# Patient Record
Sex: MALE | Race: WHITE | NOT HISPANIC OR LATINO | Employment: FULL TIME | URBAN - METROPOLITAN AREA
[De-identification: names, ages, dates, MRNs, and addresses within clinical notes are randomized per-mention and may not be internally consistent; named-entity substitution may affect disease eponyms.]

---

## 2019-06-21 ENCOUNTER — HISTORICAL (OUTPATIENT)
Dept: RADIOLOGY | Facility: HOSPITAL | Age: 64
End: 2019-06-21

## 2023-02-05 ENCOUNTER — HOSPITAL ENCOUNTER (EMERGENCY)
Facility: HOSPITAL | Age: 68
Discharge: HOME | End: 2023-02-05
Attending: STUDENT IN AN ORGANIZED HEALTH CARE EDUCATION/TRAINING PROGRAM
Payer: COMMERCIAL

## 2023-02-05 VITALS
TEMPERATURE: 98 F | SYSTOLIC BLOOD PRESSURE: 142 MMHG | RESPIRATION RATE: 17 BRPM | OXYGEN SATURATION: 96 % | HEART RATE: 60 BPM | DIASTOLIC BLOOD PRESSURE: 72 MMHG
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
## 2023-02-05 DIAGNOSIS — Q73.8 REDUCTION DEFECT OF EXTREMITY: ICD-10-CM

## 2023-02-05 DIAGNOSIS — R52 PAIN: ICD-10-CM

## 2023-02-05 DIAGNOSIS — S52.502A CLOSED FRACTURE OF DISTAL END OF LEFT RADIUS, UNSPECIFIED FRACTURE MORPHOLOGY, INITIAL ENCOUNTER: Primary | ICD-10-CM

## 2023-02-05 DIAGNOSIS — M25.532 LEFT WRIST PAIN: ICD-10-CM

## 2023-02-05 PROCEDURE — 99285 EMERGENCY DEPT VISIT HI MDM: CPT | Mod: 25

## 2023-02-05 PROCEDURE — 96374 THER/PROPH/DIAG INJ IV PUSH: CPT

## 2023-02-05 PROCEDURE — 96375 TX/PRO/DX INJ NEW DRUG ADDON: CPT

## 2023-02-05 PROCEDURE — 63600175 PHARM REV CODE 636 W HCPCS: Performed by: STUDENT IN AN ORGANIZED HEALTH CARE EDUCATION/TRAINING PROGRAM

## 2023-02-05 PROCEDURE — 96361 HYDRATE IV INFUSION ADD-ON: CPT

## 2023-02-05 RX ORDER — ONDANSETRON 2 MG/ML
4 INJECTION INTRAMUSCULAR; INTRAVENOUS
Status: COMPLETED | OUTPATIENT
Start: 2023-02-05 | End: 2023-02-05

## 2023-02-05 RX ORDER — IBUPROFEN 600 MG/1
600 TABLET ORAL EVERY 6 HOURS PRN
Qty: 15 TABLET | Refills: 0 | Status: SHIPPED | OUTPATIENT
Start: 2023-02-05 | End: 2023-02-10

## 2023-02-05 RX ORDER — PROPOFOL 10 MG/ML
100 VIAL (ML) INTRAVENOUS ONCE
Status: COMPLETED | OUTPATIENT
Start: 2023-02-05 | End: 2023-02-05

## 2023-02-05 RX ORDER — HYDROCODONE BITARTRATE AND ACETAMINOPHEN 5; 325 MG/1; MG/1
1 TABLET ORAL EVERY 8 HOURS PRN
Qty: 18 TABLET | Refills: 0 | Status: ON HOLD | OUTPATIENT
Start: 2023-02-05 | End: 2023-02-09 | Stop reason: HOSPADM

## 2023-02-05 RX ORDER — HYDROMORPHONE HYDROCHLORIDE 2 MG/ML
1 INJECTION, SOLUTION INTRAMUSCULAR; INTRAVENOUS; SUBCUTANEOUS
Status: COMPLETED | OUTPATIENT
Start: 2023-02-05 | End: 2023-02-05

## 2023-02-05 RX ADMIN — ONDANSETRON 4 MG: 2 INJECTION INTRAMUSCULAR; INTRAVENOUS at 10:02

## 2023-02-05 RX ADMIN — HYDROMORPHONE HYDROCHLORIDE 1 MG: 2 INJECTION, SOLUTION INTRAMUSCULAR; INTRAVENOUS; SUBCUTANEOUS at 10:02

## 2023-02-05 RX ADMIN — SODIUM CHLORIDE, POTASSIUM CHLORIDE, SODIUM LACTATE AND CALCIUM CHLORIDE 1000 ML: 600; 310; 30; 20 INJECTION, SOLUTION INTRAVENOUS at 10:02

## 2023-02-05 RX ADMIN — PROPOFOL 180 MG: 10 INJECTION, EMULSION INTRAVENOUS at 10:02

## 2023-02-05 NOTE — ED PROVIDER NOTES
Encounter Date: 2/5/2023    SCRIBE #1 NOTE: I, Jamarcus Flood, am scribing for, and in the presence of,  Nikolas Hoskins MD. I have scribed the following portions of the note - Other sections scribed: HPI, ROS, PE, MDM, Procedure.     History     Chief Complaint   Patient presents with    Arm Injury     Pt to ER via POV for left wrist pain.  Pt states he tripped over bed this am and landed on hand, swelling noted to left wrist     Patient is a 66 y/o male with a history of HTN presents to [facility] ED after sustaining a left wrist deformity secondary to bracing himself upon tripping and falling over the corner of a bed in a motel room this morning. Patient has complaints of left wrist pain/swelling. Patient took x2 Tylenol this morning with minimal relief.   Patient denies any shoulder/elbow pain.     The history is provided by the patient and the spouse. No  was used.   Arm Injury   The incident occurred today. The incident occurred at another residence. The injury mechanism was a fall. He came to the ER via by private vehicle. He has received no recent medical care. Pertinent negatives include no chest pain, no visual disturbance, no abdominal pain and no weakness.   Review of patient's allergies indicates:  No Known Allergies  Past Medical History:   Diagnosis Date    Anxiety     Arthritis     Closed fracture of distal end of left radius     HLD (hyperlipidemia)     Hypertension     Obesity     Pneumothorax, traumatic 09/2022    d/t broken ribs    Sleep apnea     uses CPAP     Past Surgical History:   Procedure Laterality Date    BACK SURGERY      COLONOSCOPY      KNEE ARTHROSCOPY Bilateral     OPEN REDUCTION AND INTERNAL FIXATION (ORIF) OF INJURY OF FOREARM Left 2/9/2023    Procedure: ORIF, FOREARM;  Surgeon: Cleve Stout DO;  Location: [facility];  Service: Orthopedics;  Laterality: Left;     History reviewed. No pertinent family history.  Social History     Tobacco Use    Smoking status:  Never    Smokeless tobacco: Never   Substance Use Topics    Alcohol use: Yes     Alcohol/week: 21.0 standard drinks     Types: 21 Cans of beer per week    Drug use: Never     Review of Systems   Constitutional:  Negative for fever.   HENT:  Negative for sore throat.    Eyes:  Negative for visual disturbance.   Respiratory:  Negative for shortness of breath.    Cardiovascular:  Negative for chest pain.   Gastrointestinal:  Negative for abdominal pain.   Genitourinary:  Negative for dysuria.   Musculoskeletal:  Negative for joint swelling.        L wrist deformity   Skin:  Negative for rash.   Neurological:  Negative for weakness.   Psychiatric/Behavioral:  Negative for confusion.    All other systems reviewed and are negative.    Physical Exam     Initial Vitals [02/05/23 0809]   BP Pulse Resp Temp SpO2   (!) 148/79 64 18 98.3 °F (36.8 °C) 98 %      MAP       --         Physical Exam    Nursing note and vitals reviewed.  Constitutional: He appears well-developed. He does not appear ill. No distress.   HENT:   Head: Normocephalic and atraumatic.   Mouth/Throat: Oropharynx is clear and moist.   No abrasions, contusions, lacerations to the scalp or face.  No superior inferior orbital ridge tenderness to palpation.  No zygomatic arch tenderness to palpation.  No epistaxis.  No CSF rhinorrhea.  No septal hematoma.  No intraoral injuries noted.  Normal external ear.  No raccoon eyes.  No King sign.     Eyes: Conjunctivae and EOM are normal.   Neck: Neck supple.   Normal range of motion.  Cardiovascular:  Normal rate and regular rhythm.           No murmur heard.  Pulmonary/Chest: Breath sounds normal. No respiratory distress. He exhibits no tenderness.   Abdominal: Abdomen is soft. Bowel sounds are normal. He exhibits no distension. There is no abdominal tenderness.   Musculoskeletal:         General: Tenderness and edema present.      Cervical back: Normal range of motion and neck supple.      Lumbar back: Normal. No  "tenderness. Normal range of motion.      Comments: No C,T or L-spine vertebral point tenderness to palpation, no step-offs, no deformities.  Right upper extremity:  Full range of motion of shoulder, elbow, wrist, no deformity or tenderness to palpation.  Left upper extremity: Full range of motion of shoulder, elbow, no deformity or tenderness to palpation. Deformity of L wrist.   Right lower extremity:  Full range of motion of hip, knee, ankle, no tenderness palpation or deformity noted.  Left lower extremity:  Full range of motion of hip, knee, ankle, no tenderness palpation or deformity noted.       Neurological: He is alert and oriented to person, place, and time. He has normal strength. No cranial nerve deficit or sensory deficit.   Psychiatric: He has a normal mood and affect. His mood appears not anxious.       ED Course   Procedural Sedation        Date/Time: 2023 10:16 AM  Performed by: Nikolas Hoskins MD  Authorized by: Nikolas Hoskins MD   Consent Done: Yes  Consent: Written consent obtained.  Risks and benefits: risks, benefits and alternatives were discussed  Consent given by: patient  Patient understanding: patient states understanding of the procedure being performed  Patient consent: the patient's understanding of the procedure matches consent given  Procedure consent: procedure consent matches procedure scheduled  Relevant documents: relevant documents present and verified  Test results: test results available and properly labeled  Site marked: the operative site was marked  Imaging studies: imaging studies available  Patient identity confirmed:  and MRN  Time out: Immediately prior to procedure a "time out" was called to verify the correct patient, procedure, equipment, support staff and site/side marked as required.  ASA Class: Class 1 - Heathy patient. No medical history.NPO STATUS:  Date/Time of last solid: 2023 7:00 PM  Date/Time of last fluid: 2023 7:00 AM    Equipment: on " supplemental oxygen., on cardiac monitor., on BP monitor., on CO2 monitor., suction available., airway equipment available. and reversal drugs available.     Sedation type: moderate (conscious) sedation    Sedatives: propofol  Sedation start date/time: 2/5/2023 10:16 AM  Sedation end date/time: 2/5/2023 10:32 AM  Vitals: Vital signs were monitored during sedation.  Complications: No complications.   Patient/Family history of anesthesia or sedation complications: No    Splint Application    Date/Time: 2/5/2023 10:38 AM  Performed by: Nikolas Hoskins MD  Authorized by: Nikolas Hoskins MD   Location details: left wrist  Splint type: sugar tong  Supplies used: Ortho-Glass and cotton padding  Post-procedure: The splinted body part was neurovascularly unchanged following the procedure.  Patient tolerance: Patient tolerated the procedure well with no immediate complications      Orthopedic Injury    Date/Time: 2/16/2023 7:29 AM  Performed by: Nikolas Hoskins MD  Authorized by: Nikolas Hoskins MD     Location procedure was performed:  [facility] EMERGENCY DEPARTMENT  Injury:     Injury location:  Wrist    Location details:  Left wrist    Injury type:  Fracture-dislocation      Pre-procedure assessment:     Neurovascular status: Neurovascularly intact      Distal perfusion: normal      Neurological function: normal      Range of motion: reduced      Local anesthesia used?: No      Patient sedated?: Yes      Sedation type: moderate (conscious) sedation      Sedation:  Propofol      Selections made in this section will also lock the Injury type section above.:     Manipulation performed?: Yes      Skin traction used?: Yes      Confirmation: Reduction confirmed by x-ray      Immobilization:  Splint    Splint type:  Sugar tong    Supplies used:  Ortho-Glass  Post-procedure assessment:     Neurovascular status: Neurovascularly intact      Distal perfusion: normal      Neurological function: normal      Range of motion: splinted       Patient tolerance:  Patient tolerated the procedure well with no immediate complications  Labs Reviewed - No data to display       Imaging Results              X-Ray Wrist 2 View Left (Final result)  Result time 02/05/23 11:06:26   Procedure changed from X-Ray Wrist Complete Left     Final result by Jj Harry MD (02/05/23 11:06:26)                   Impression:      As above.      Electronically signed by: Jj Harry  Date:    02/05/2023  Time:    11:06               Narrative:    EXAMINATION:  XR WRIST 2 VIEW LEFT    CLINICAL HISTORY:  post reduction;  Other reduction defects of unspecified limb(s)    COMPARISON:  Earlier today    FINDINGS:  Two views of the left wrist.  Interval splinting with improved position and alignment of the distal radial fracture.                                       X-Ray Elbow Complete Left (Final result)  Result time 02/05/23 09:59:55      Final result by Jj Harry MD (02/05/23 09:59:55)                   Impression:      No acute findings.      Electronically signed by: Jj Harry  Date:    02/05/2023  Time:    09:59               Narrative:    EXAMINATION:  XR ELBOW COMPLETE 3 VIEW LEFT    CLINICAL HISTORY:  Unspecified fall, initial encounter    COMPARISON:  None    FINDINGS:  Three views of the left elbow.  There is no fracture, dislocation or convincing joint effusion.                                       X-Ray Wrist Complete Left (Final result)  Result time 02/05/23 09:59:27      Final result by Jj Harry MD (02/05/23 09:59:27)                   Impression:      Left wrist fracture as discussed.      Electronically signed by: Jj Harry  Date:    02/05/2023  Time:    09:59               Narrative:    EXAMINATION:  XR WRIST COMPLETE 3 VIEWS LEFT    CLINICAL HISTORY:  Pain, unspecified    COMPARISON:  None    FINDINGS:  Three views of the left wrist.  There is comminuted and impacted fracture through the distal radial metaphysis.  There is dorsal angulation  of the distal fracture fragment.  Intra-articular extension of the fracture is possible.  There is also ulnar styloid fracture.  Degenerative changes are seen primarily at the 1st carpometacarpal joint.                                    X-Rays:   Independently Interpreted Readings:   Other Readings:  Xr left wrist: Radius fx.     Xr after reduction: Improved alignment.   Medications   propofol (DIPRIVAN) 10 mg/mL IVP (180 mg Intravenous Given 2/5/23 1045)   HYDROmorphone (PF) injection 1 mg (1 mg Intravenous Given 2/5/23 1003)   ondansetron injection 4 mg (4 mg Intravenous Given 2/5/23 1003)   lactated ringers bolus 1,000 mL (0 mLs Intravenous Stopped 2/5/23 1151)     Medical Decision Making:   History:   I obtained history from: someone other than patient.       <> Summary of History: Collateral history obtained from wife at bedside who states that the patient had a trip and fall this morning. Patient took x2 Tylenol prior to arrival with minimal relief.   Old Medical Records: I decided to obtain old medical records.  Old Records Summarized: records from clinic visits and records from previous admission(s).       <> Summary of Records: Reviewed previous records.  Patient has history of high cholesterol, hypertension.  Initial Assessment:   Left wrist fracture  Differential Diagnosis:   Left wrist fracture, contusion, dislocation,  Independently Interpreted Test(s):   I have ordered and independently interpreted X-rays - see prior notes.  Clinical Tests:   Radiological Study: Ordered and Reviewed  ED Management:  Patient is a 67-year-old male presents to the emergency department for left wrist fracture.  See HPI.  See physical exam.  Reduced under conscious sedation and splinted.  Discussed with Orthopedic surgery.  Discussed need for follow-up with orthopedic surgery outpatient with the patient and family.  Answered all questions this time.  Pain control.  Discussed all results answered all questions at this time.   Hemodynamically stable for continued outpatient management strict return precautions.  Neurovascularly intact after splint placement.  Other:   I have discussed this case with another health care provider.   Medical Decision Making  Problems Addressed:  Closed fracture of distal end of left radius, unspecified fracture morphology, initial encounter: acute illness or injury that poses a threat to life or bodily functions  Left wrist pain: acute illness or injury that poses a threat to life or bodily functions  Reduction defect of extremity: acute illness or injury that poses a threat to life or bodily functions    Amount and/or Complexity of Data Reviewed  External Data Reviewed: radiology.  Radiology: ordered and independent interpretation performed.    Risk  Parenteral controlled substances.           Scribe Attestation:   Scribe #1: I performed the above scribed service and the documentation accurately describes the services I performed. I attest to the accuracy of the note.    Attending Attestation:           Physician Attestation for Scribe:  Physician Attestation Statement for Scribe #1: I, Nikolas Hoskins MD, reviewed documentation, as scribed by Jamarcus Flood in my presence, and it is both accurate and complete.                        Clinical Impression:   Final diagnoses:  [R52] Pain  [W19.XXXA] Fall  [Q73.8] Reduction defect of extremity  [W19.XXXA] Fall, initial encounter  [M25.532] Left wrist pain  [S52.502A] Closed fracture of distal end of left radius, unspecified fracture morphology, initial encounter (Primary)        ED Disposition Condition    Discharge Stable          ED Prescriptions       Medication Sig Dispense Start Date End Date Auth. Provider    HYDROcodone-acetaminophen (NORCO) 5-325 mg per tablet () Take 1 tablet by mouth every 8 (eight) hours as needed for Pain. 18 tablet 2023 Nikolas Hoskins MD    ibuprofen (ADVIL,MOTRIN) 600 MG tablet () Take 1 tablet  (600 mg total) by mouth every 6 (six) hours as needed for Pain. 15 tablet 2/5/2023 2/10/2023 Nikolas Hoskins MD          Follow-up Information       Follow up With Specialties Details Why Contact Info    Rodolfo Castillo MD Family Medicine   [address] [facility] [address]  [phone]      Your primary care physician.        Cleve Stout, DO Orthopedic Surgery   [address] [facility] [facility]  [address]  [facility] [address]  [phone]               Nikolas Hoskins MD  02/16/23 0732

## 2023-02-05 NOTE — DISCHARGE INSTRUCTIONS
Follow-up with orthopedic surgery.  Please see phone number for follow-up appointment.      You may take medications as prescribed for pain.      Follow up with your primary care physician or provider in 1-2 days.      Return to the emergency department if any you have any worsening symptoms, new symptoms, or any other concerns.      Please signup for MyChart as noted below in your paperwork to review all labwork, imaging results, and any other incidental findings from today's visit.       Advice after your visit:  Please follow-up with your primary care doctor and/or specialist within 1-2 days.  Please return if you have any worsening in your condition or if you have any other concerns.    If you had radiology exams like an XRAY. Bring these to your primary care doctor and/or specialist for further review of incidental findings.    If you were prescribed OPIATE PAIN MEDICATION - please understand of these medications can be addictive, you may fill less of the prescription was written for, you do not have to take the full prescription.  You may discard what you do not use.  Please seek help if you feel you are having problems with addiction.  Do not drive or operate heavy machinery if you are taking sedating medications.  Do not mix these medications with alcohol.      If you had a SPLINT placed in the emergency department if you have severe pain numbness tingling or discoloration of year digits please remove the splint and return to the emergency department for further evaluation as this may represent a sign of compromise to the nerves or blood vessels due to swelling.

## 2023-02-06 ENCOUNTER — OFFICE VISIT (OUTPATIENT)
Dept: ORTHOPEDICS | Facility: CLINIC | Age: 68
End: 2023-02-06
Payer: COMMERCIAL

## 2023-02-06 ENCOUNTER — LAB VISIT (OUTPATIENT)
Dept: LAB | Facility: HOSPITAL | Age: 68
End: 2023-02-06
Attending: ORTHOPAEDIC SURGERY
Payer: COMMERCIAL

## 2023-02-06 VITALS
WEIGHT: 222 LBS | HEIGHT: 71 IN | SYSTOLIC BLOOD PRESSURE: 130 MMHG | HEART RATE: 61 BPM | TEMPERATURE: 98 F | BODY MASS INDEX: 31.08 KG/M2 | DIASTOLIC BLOOD PRESSURE: 77 MMHG | RESPIRATION RATE: 18 BRPM

## 2023-02-06 DIAGNOSIS — S52.502A CLOSED FRACTURE OF DISTAL END OF LEFT RADIUS, UNSPECIFIED FRACTURE MORPHOLOGY, INITIAL ENCOUNTER: Primary | ICD-10-CM

## 2023-02-06 DIAGNOSIS — F10.20 ALCOHOLISM: ICD-10-CM

## 2023-02-06 DIAGNOSIS — S52.502A CLOSED FRACTURE OF DISTAL END OF LEFT RADIUS, UNSPECIFIED FRACTURE MORPHOLOGY, INITIAL ENCOUNTER: ICD-10-CM

## 2023-02-06 LAB
ANION GAP SERPL CALC-SCNC: 9 MEQ/L
BASOPHILS # BLD AUTO: 0.05 X10(3)/MCL (ref 0–0.2)
BASOPHILS NFR BLD AUTO: 0.7 %
BUN SERPL-MCNC: 18.5 MG/DL (ref 8.4–25.7)
CALCIUM SERPL-MCNC: 9.6 MG/DL (ref 8.8–10)
CHLORIDE SERPL-SCNC: 104 MMOL/L (ref 98–107)
CO2 SERPL-SCNC: 27 MMOL/L (ref 23–31)
CREAT SERPL-MCNC: 1.01 MG/DL (ref 0.73–1.18)
CREAT/UREA NIT SERPL: 18
EOSINOPHIL # BLD AUTO: 0.18 X10(3)/MCL (ref 0–0.9)
EOSINOPHIL NFR BLD AUTO: 2.4 %
ERYTHROCYTE [DISTWIDTH] IN BLOOD BY AUTOMATED COUNT: 12.5 % (ref 11.5–17)
GFR SERPLBLD CREATININE-BSD FMLA CKD-EPI: >60 MLS/MIN/1.73/M2
GLUCOSE SERPL-MCNC: 102 MG/DL (ref 82–115)
HCT VFR BLD AUTO: 42.5 % (ref 42–52)
HGB BLD-MCNC: 14 GM/DL (ref 14–18)
IMM GRANULOCYTES # BLD AUTO: 0.03 X10(3)/MCL (ref 0–0.04)
IMM GRANULOCYTES NFR BLD AUTO: 0.4 %
INR BLD: 0.95 (ref 2–3)
LYMPHOCYTES # BLD AUTO: 1.43 X10(3)/MCL (ref 0.6–4.6)
LYMPHOCYTES NFR BLD AUTO: 18.8 %
MCH RBC QN AUTO: 32.5 PG
MCHC RBC AUTO-ENTMCNC: 32.9 MG/DL (ref 33–36)
MCV RBC AUTO: 98.6 FL (ref 80–94)
MONOCYTES # BLD AUTO: 1.01 X10(3)/MCL (ref 0.1–1.3)
MONOCYTES NFR BLD AUTO: 13.3 %
NEUTROPHILS # BLD AUTO: 4.91 X10(3)/MCL (ref 2.1–9.2)
NEUTROPHILS NFR BLD AUTO: 64.4 %
NRBC BLD AUTO-RTO: 0 %
PLATELET # BLD AUTO: 269 X10(3)/MCL (ref 130–400)
PMV BLD AUTO: 10.4 FL (ref 7.4–10.4)
POTASSIUM SERPL-SCNC: 4.2 MMOL/L (ref 3.5–5.1)
PROTHROMBIN TIME: 12.5 SECONDS (ref 11.7–14.5)
RBC # BLD AUTO: 4.31 X10(6)/MCL (ref 4.7–6.1)
SODIUM SERPL-SCNC: 140 MMOL/L (ref 136–145)
WBC # SPEC AUTO: 7.6 X10(3)/MCL (ref 4.5–11.5)

## 2023-02-06 PROCEDURE — 4010F PR ACE/ARB THEARPY RXD/TAKEN: ICD-10-PCS | Mod: CPTII,,, | Performed by: ORTHOPAEDIC SURGERY

## 2023-02-06 PROCEDURE — 99204 PR OFFICE/OUTPT VISIT, NEW, LEVL IV, 45-59 MIN: ICD-10-PCS | Mod: ,,, | Performed by: ORTHOPAEDIC SURGERY

## 2023-02-06 PROCEDURE — 3008F PR BODY MASS INDEX (BMI) DOCUMENTED: ICD-10-PCS | Mod: CPTII,,, | Performed by: ORTHOPAEDIC SURGERY

## 2023-02-06 PROCEDURE — 3078F DIAST BP <80 MM HG: CPT | Mod: CPTII,,, | Performed by: ORTHOPAEDIC SURGERY

## 2023-02-06 PROCEDURE — 85025 COMPLETE CBC W/AUTO DIFF WBC: CPT

## 2023-02-06 PROCEDURE — 3075F PR MOST RECENT SYSTOLIC BLOOD PRESS GE 130-139MM HG: ICD-10-PCS | Mod: CPTII,,, | Performed by: ORTHOPAEDIC SURGERY

## 2023-02-06 PROCEDURE — 36415 COLL VENOUS BLD VENIPUNCTURE: CPT

## 2023-02-06 PROCEDURE — 4010F ACE/ARB THERAPY RXD/TAKEN: CPT | Mod: CPTII,,, | Performed by: ORTHOPAEDIC SURGERY

## 2023-02-06 PROCEDURE — 80048 BASIC METABOLIC PNL TOTAL CA: CPT

## 2023-02-06 PROCEDURE — 3008F BODY MASS INDEX DOCD: CPT | Mod: CPTII,,, | Performed by: ORTHOPAEDIC SURGERY

## 2023-02-06 PROCEDURE — 99204 OFFICE O/P NEW MOD 45 MIN: CPT | Mod: ,,, | Performed by: ORTHOPAEDIC SURGERY

## 2023-02-06 PROCEDURE — 3075F SYST BP GE 130 - 139MM HG: CPT | Mod: CPTII,,, | Performed by: ORTHOPAEDIC SURGERY

## 2023-02-06 PROCEDURE — 1159F PR MEDICATION LIST DOCUMENTED IN MEDICAL RECORD: ICD-10-PCS | Mod: CPTII,,, | Performed by: ORTHOPAEDIC SURGERY

## 2023-02-06 PROCEDURE — 85610 PROTHROMBIN TIME: CPT

## 2023-02-06 PROCEDURE — 1159F MED LIST DOCD IN RCRD: CPT | Mod: CPTII,,, | Performed by: ORTHOPAEDIC SURGERY

## 2023-02-06 PROCEDURE — 3078F PR MOST RECENT DIASTOLIC BLOOD PRESSURE < 80 MM HG: ICD-10-PCS | Mod: CPTII,,, | Performed by: ORTHOPAEDIC SURGERY

## 2023-02-06 RX ORDER — MUPIROCIN 20 MG/G
OINTMENT TOPICAL
Status: CANCELLED | OUTPATIENT
Start: 2023-02-06

## 2023-02-06 RX ORDER — SODIUM CHLORIDE 0.9 % (FLUSH) 0.9 %
10 SYRINGE (ML) INJECTION
Status: DISCONTINUED | OUTPATIENT
Start: 2023-02-06 | End: 2023-02-07 | Stop reason: CLARIF

## 2023-02-06 NOTE — H&P (VIEW-ONLY)
"Subjective:       Patient ID: Bruno Moreno is a 67 y.o. male.  Chief Complaint   Patient presents with    Left Wrist - Injury     F/U FROM ER, AFTER TRIP AND FALL INJURING LEFT WRIST, SEEN IN ER, REDUCED/SPLINTED.           HPI:  Patient has left distal radius fracture.  Diagnosed in the ER appropriately reduced.  Patient admits to daily beer drinking.  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  He is dull achy pain left wrist with range of motion better rest better with pain medication.  He had a trip and fall at a motel onto his left wrist.  Patient admits to multiple previous injuries to the left hand due to trips and falls.    ROS:  Constitutional: Denies fever chills  Eyes: No change in vision  ENT: No ringing or current infections  CV: No chest pain  Resp: No labored breathing  MSK: Pain evident at site of injury located in HPI,   Integ: No signs of abrasions or lacerations  Neuro: No numbness or tingling  Lymphatic: No swelling outside the area of injury     History reviewed. No pertinent past medical history.   History reviewed. No pertinent surgical history.   Current Outpatient Medications on File Prior to Visit   Medication Sig Dispense Refill    HYDROcodone-acetaminophen (NORCO) 5-325 mg per tablet Take 1 tablet by mouth every 8 (eight) hours as needed for Pain. 18 tablet 0    ibuprofen (ADVIL,MOTRIN) 600 MG tablet Take 1 tablet (600 mg total) by mouth every 6 (six) hours as needed for Pain. 15 tablet 0     No current facility-administered medications on file prior to visit.      History reviewed. No pertinent family history.   Social History     Tobacco Use    Smoking status: Never    Smokeless tobacco: Never      No LMP for male patient.          Objective:      /77   Pulse 61   Temp 98.4 °F (36.9 °C) (Oral)   Resp 18   Ht 5' 11" (1.803 m)   Wt 100.7 kg (222 lb)   BMI 30.96 kg/m²   General the patient is alert and oriented x3 no acute distress nontoxic-appearing appropriate " affect.    Constitutional: Vital signs are examined and stable.  Resp: No signs of labored breathing    LUE: --Skin: No signs of new abrasions or lacerations, no scars           -MSK: STR 5/5 AIN/PIN/Median/Radial/Ulnar motor           -Neuro:  Sensation intact to light touch C5-T1 dermatomes           -Lymphatic: No signs of lymphadenopathy, No signs of swelling,           -CV:Capillary refill is less than 2 seconds. Radial and ulnar pulses 2/4. Compartments Soft and compressible              Body mass index is 30.96 kg/m².  Ideal body weight: 75.3 kg (166 lb 0.1 oz)  Adjusted ideal body weight: 85.5 kg (188 lb 6.5 oz)  No results found for: NA, K, CL, CO2, GLU, CALCIUM, BUN, LABCREA, GFRAA, GFRNONAA   No results found for: CBC    Radiology:  Three views left wrist showing skeletally mature individual with a distal radius fracture ulnar styloid severe 60 degree dorsal angulation and comminution.  Postreduction films show adequate alignment with near 15° of dorsal angulation.        Assessment:         1. Closed fracture of distal end of left radius, unspecified fracture morphology, initial encounter        2. Alcoholism                Plan:         No follow-ups on file.    There are no diagnoses linked to this encounter.      Patient has a severely displaced left distal radius fracture appears to be extra-articular and ulnar styloid fracture.  Due to initial displacement and comminution dorsally concerned with loss of reduction.  Patient currently in a sugar-tong splint.  We discussed surgery.  Patient will be scheduled for Thursday for surgery he will be nonweightbearing.  He does admit to daily alcohol use but no signs of withdrawals.  He will continue to take the ER pain medication up until surgery.  We discussed using single versus dual plates in the setting of severe swelling possible bridge plate.    I explained that surgery and the nature of their condition are not without risks. These include, but are not  limited to, bleeding, infection, neurovascular compromise, malunion, nonunion, hardware complications, wound complications, scarring, cosmetic defects, need for later and/or repeated surgeries, pain, loss of ROM, loss of function, PTOA, deformity, stance/gait and/or functional abnormalities, thromboembolic complications, compartment syndrome, loss of limb, loss of life, anesthetic complications, and other imponderables. I explained that these can occur despite the adequacy of treatments rendered, and that their risks are heightened given the nature of their condition. They verbalized understanding. They would like to continue with surgery at this time. If appropriate family was involved with surgical discussion.     This note/OR report was created with the assistance of  voice recognition software or phone  dictation.  There may be transcription errors as a result of using this technology however minimal. Effort has been made to assure accuracy of transcription but any obvious errors or omissions should be clarified with the author of the document.     No future appointments.        Cleve Stout DO  Orthopedic Trauma Surgery  02/06/2023

## 2023-02-06 NOTE — PROGRESS NOTES
"Subjective:       Patient ID: Bruno Moreno is a 67 y.o. male.  Chief Complaint   Patient presents with    Left Wrist - Injury     F/U FROM ER, AFTER TRIP AND FALL INJURING LEFT WRIST, SEEN IN ER, REDUCED/SPLINTED.           HPI:  Patient has left distal radius fracture.  Diagnosed in the ER appropriately reduced.  Patient admits to daily beer drinking.  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  He is dull achy pain left wrist with range of motion better rest better with pain medication.  He had a trip and fall at a motel onto his left wrist.  Patient admits to multiple previous injuries to the left hand due to trips and falls.    ROS:  Constitutional: Denies fever chills  Eyes: No change in vision  ENT: No ringing or current infections  CV: No chest pain  Resp: No labored breathing  MSK: Pain evident at site of injury located in HPI,   Integ: No signs of abrasions or lacerations  Neuro: No numbness or tingling  Lymphatic: No swelling outside the area of injury     History reviewed. No pertinent past medical history.   History reviewed. No pertinent surgical history.   Current Outpatient Medications on File Prior to Visit   Medication Sig Dispense Refill    HYDROcodone-acetaminophen (NORCO) 5-325 mg per tablet Take 1 tablet by mouth every 8 (eight) hours as needed for Pain. 18 tablet 0    ibuprofen (ADVIL,MOTRIN) 600 MG tablet Take 1 tablet (600 mg total) by mouth every 6 (six) hours as needed for Pain. 15 tablet 0     No current facility-administered medications on file prior to visit.      History reviewed. No pertinent family history.   Social History     Tobacco Use    Smoking status: Never    Smokeless tobacco: Never      No LMP for male patient.          Objective:      /77   Pulse 61   Temp 98.4 °F (36.9 °C) (Oral)   Resp 18   Ht 5' 11" (1.803 m)   Wt 100.7 kg (222 lb)   BMI 30.96 kg/m²   General the patient is alert and oriented x3 no acute distress nontoxic-appearing appropriate " affect.    Constitutional: Vital signs are examined and stable.  Resp: No signs of labored breathing    LUE: --Skin: No signs of new abrasions or lacerations, no scars           -MSK: STR 5/5 AIN/PIN/Median/Radial/Ulnar motor           -Neuro:  Sensation intact to light touch C5-T1 dermatomes           -Lymphatic: No signs of lymphadenopathy, No signs of swelling,           -CV:Capillary refill is less than 2 seconds. Radial and ulnar pulses 2/4. Compartments Soft and compressible              Body mass index is 30.96 kg/m².  Ideal body weight: 75.3 kg (166 lb 0.1 oz)  Adjusted ideal body weight: 85.5 kg (188 lb 6.5 oz)  No results found for: NA, K, CL, CO2, GLU, CALCIUM, BUN, LABCREA, GFRAA, GFRNONAA   No results found for: CBC    Radiology:  Three views left wrist showing skeletally mature individual with a distal radius fracture ulnar styloid severe 60 degree dorsal angulation and comminution.  Postreduction films show adequate alignment with near 15° of dorsal angulation.        Assessment:         1. Closed fracture of distal end of left radius, unspecified fracture morphology, initial encounter        2. Alcoholism                Plan:         No follow-ups on file.    There are no diagnoses linked to this encounter.      Patient has a severely displaced left distal radius fracture appears to be extra-articular and ulnar styloid fracture.  Due to initial displacement and comminution dorsally concerned with loss of reduction.  Patient currently in a sugar-tong splint.  We discussed surgery.  Patient will be scheduled for Thursday for surgery he will be nonweightbearing.  He does admit to daily alcohol use but no signs of withdrawals.  He will continue to take the ER pain medication up until surgery.  We discussed using single versus dual plates in the setting of severe swelling possible bridge plate.    I explained that surgery and the nature of their condition are not without risks. These include, but are not  limited to, bleeding, infection, neurovascular compromise, malunion, nonunion, hardware complications, wound complications, scarring, cosmetic defects, need for later and/or repeated surgeries, pain, loss of ROM, loss of function, PTOA, deformity, stance/gait and/or functional abnormalities, thromboembolic complications, compartment syndrome, loss of limb, loss of life, anesthetic complications, and other imponderables. I explained that these can occur despite the adequacy of treatments rendered, and that their risks are heightened given the nature of their condition. They verbalized understanding. They would like to continue with surgery at this time. If appropriate family was involved with surgical discussion.     This note/OR report was created with the assistance of  voice recognition software or phone  dictation.  There may be transcription errors as a result of using this technology however minimal. Effort has been made to assure accuracy of transcription but any obvious errors or omissions should be clarified with the author of the document.     No future appointments.        Cleve Stout DO  Orthopedic Trauma Surgery  02/06/2023

## 2023-02-07 ENCOUNTER — ANESTHESIA EVENT (OUTPATIENT)
Dept: SURGERY | Facility: HOSPITAL | Age: 68
End: 2023-02-07
Payer: COMMERCIAL

## 2023-02-07 RX ORDER — BENAZEPRIL HYDROCHLORIDE 40 MG/1
40 TABLET ORAL DAILY
COMMUNITY
Start: 2023-02-02

## 2023-02-07 RX ORDER — UBIDECARENONE 30 MG
60 CAPSULE ORAL DAILY
COMMUNITY

## 2023-02-07 RX ORDER — ASPIRIN 81 MG/1
81 TABLET ORAL DAILY
COMMUNITY

## 2023-02-07 RX ORDER — LOVASTATIN 20 MG/1
20 TABLET ORAL DAILY
COMMUNITY
Start: 2023-02-02

## 2023-02-07 RX ORDER — EZETIMIBE 10 MG/1
10 TABLET ORAL DAILY
COMMUNITY
Start: 2023-02-02

## 2023-02-07 RX ORDER — CITALOPRAM 20 MG/1
40 TABLET, FILM COATED ORAL DAILY
COMMUNITY
Start: 2022-11-30

## 2023-02-09 ENCOUNTER — HOSPITAL ENCOUNTER (OUTPATIENT)
Facility: HOSPITAL | Age: 68
Discharge: HOME | End: 2023-02-09
Attending: ORTHOPAEDIC SURGERY | Admitting: ORTHOPAEDIC SURGERY
Payer: COMMERCIAL

## 2023-02-09 ENCOUNTER — ANESTHESIA (OUTPATIENT)
Dept: SURGERY | Facility: HOSPITAL | Age: 68
End: 2023-02-09
Payer: COMMERCIAL

## 2023-02-09 VITALS
SYSTOLIC BLOOD PRESSURE: 137 MMHG | WEIGHT: 220.25 LBS | BODY MASS INDEX: 31.53 KG/M2 | OXYGEN SATURATION: 96 % | DIASTOLIC BLOOD PRESSURE: 83 MMHG | HEIGHT: 70 IN | TEMPERATURE: 98 F | RESPIRATION RATE: 14 BRPM | HEART RATE: 61 BPM

## 2023-02-09 DIAGNOSIS — S52.502A CLOSED FRACTURE OF DISTAL END OF LEFT RADIUS, UNSPECIFIED FRACTURE MORPHOLOGY, INITIAL ENCOUNTER: ICD-10-CM

## 2023-02-09 PROCEDURE — 63600175 PHARM REV CODE 636 W HCPCS

## 2023-02-09 PROCEDURE — 71000016 HC POSTOP RECOV ADDL HR: Performed by: ORTHOPAEDIC SURGERY

## 2023-02-09 PROCEDURE — 25607 PR OPEN RX DISTAL RADIUS FX, EXTRA-ARTICULAR: ICD-10-PCS | Mod: LT,,, | Performed by: ORTHOPAEDIC SURGERY

## 2023-02-09 PROCEDURE — 63600175 PHARM REV CODE 636 W HCPCS: Performed by: ORTHOPAEDIC SURGERY

## 2023-02-09 PROCEDURE — C1776 JOINT DEVICE (IMPLANTABLE): HCPCS | Performed by: ORTHOPAEDIC SURGERY

## 2023-02-09 PROCEDURE — C1713 ANCHOR/SCREW BN/BN,TIS/BN: HCPCS | Performed by: ORTHOPAEDIC SURGERY

## 2023-02-09 PROCEDURE — 63600175 PHARM REV CODE 636 W HCPCS: Performed by: ANESTHESIOLOGY

## 2023-02-09 PROCEDURE — 25000003 PHARM REV CODE 250: Performed by: NURSE ANESTHETIST, CERTIFIED REGISTERED

## 2023-02-09 PROCEDURE — 63600175 PHARM REV CODE 636 W HCPCS: Performed by: NURSE ANESTHETIST, CERTIFIED REGISTERED

## 2023-02-09 PROCEDURE — 37000008 HC ANESTHESIA 1ST 15 MINUTES: Performed by: ORTHOPAEDIC SURGERY

## 2023-02-09 PROCEDURE — 27201423 OPTIME MED/SURG SUP & DEVICES STERILE SUPPLY: Performed by: ORTHOPAEDIC SURGERY

## 2023-02-09 PROCEDURE — 76942 ECHO GUIDE FOR BIOPSY: CPT | Performed by: ANESTHESIOLOGY

## 2023-02-09 PROCEDURE — 71000015 HC POSTOP RECOV 1ST HR: Performed by: ORTHOPAEDIC SURGERY

## 2023-02-09 PROCEDURE — 25001: ICD-10-PCS | Mod: 51,LT,, | Performed by: ORTHOPAEDIC SURGERY

## 2023-02-09 PROCEDURE — 36000711: Performed by: ORTHOPAEDIC SURGERY

## 2023-02-09 PROCEDURE — 25607 OPTX DST RD XARTC FX/EPI SEP: CPT | Mod: LT,,, | Performed by: ORTHOPAEDIC SURGERY

## 2023-02-09 PROCEDURE — 71000033 HC RECOVERY, INTIAL HOUR: Performed by: ORTHOPAEDIC SURGERY

## 2023-02-09 PROCEDURE — 25607 OPTX DST RD XARTC FX/EPI SEP: CPT | Mod: AS,LT,, | Performed by: PHYSICIAN ASSISTANT

## 2023-02-09 PROCEDURE — 37000009 HC ANESTHESIA EA ADD 15 MINS: Performed by: ORTHOPAEDIC SURGERY

## 2023-02-09 PROCEDURE — 25607 PR OPEN RX DISTAL RADIUS FX, EXTRA-ARTICULAR: ICD-10-PCS | Mod: AS,LT,, | Performed by: PHYSICIAN ASSISTANT

## 2023-02-09 PROCEDURE — 36000710: Performed by: ORTHOPAEDIC SURGERY

## 2023-02-09 PROCEDURE — 25001 INCISE FLEXOR CARPI RADIALIS: CPT | Mod: 51,LT,, | Performed by: ORTHOPAEDIC SURGERY

## 2023-02-09 PROCEDURE — 25000003 PHARM REV CODE 250: Performed by: ORTHOPAEDIC SURGERY

## 2023-02-09 PROCEDURE — C1769 GUIDE WIRE: HCPCS | Performed by: ORTHOPAEDIC SURGERY

## 2023-02-09 DEVICE — PEG THREADED PEG 2.3X18MM: Type: IMPLANTABLE DEVICE | Site: WRIST | Status: FUNCTIONAL

## 2023-02-09 DEVICE — SCREW CORT NON LOK TI 3.5X14MM: Type: IMPLANTABLE DEVICE | Site: WRIST | Status: FUNCTIONAL

## 2023-02-09 DEVICE — IMPLANTABLE DEVICE: Type: IMPLANTABLE DEVICE | Site: WRIST | Status: FUNCTIONAL

## 2023-02-09 RX ORDER — HYDROCODONE BITARTRATE AND ACETAMINOPHEN 10; 325 MG/1; MG/1
1 TABLET ORAL EVERY 4 HOURS PRN
Qty: 42 TABLET | Refills: 0 | Status: SHIPPED | OUTPATIENT
Start: 2023-02-09 | End: 2023-03-10 | Stop reason: SDUPTHER

## 2023-02-09 RX ORDER — LIDOCAINE HYDROCHLORIDE 20 MG/ML
INJECTION, SOLUTION EPIDURAL; INFILTRATION; INTRACAUDAL; PERINEURAL
Status: DISCONTINUED | OUTPATIENT
Start: 2023-02-09 | End: 2023-02-09

## 2023-02-09 RX ORDER — ROPIVACAINE HYDROCHLORIDE 5 MG/ML
INJECTION, SOLUTION EPIDURAL; INFILTRATION; PERINEURAL
Status: COMPLETED
Start: 2023-02-09 | End: 2023-02-09

## 2023-02-09 RX ORDER — MIDAZOLAM HYDROCHLORIDE 1 MG/ML
INJECTION INTRAMUSCULAR; INTRAVENOUS
Status: COMPLETED
Start: 2023-02-09 | End: 2023-02-09

## 2023-02-09 RX ORDER — HYDROMORPHONE HYDROCHLORIDE 2 MG/ML
0.2 INJECTION, SOLUTION INTRAMUSCULAR; INTRAVENOUS; SUBCUTANEOUS EVERY 5 MIN PRN
Status: DISCONTINUED | OUTPATIENT
Start: 2023-02-09 | End: 2023-02-09 | Stop reason: HOSPADM

## 2023-02-09 RX ORDER — ONDANSETRON 2 MG/ML
4 INJECTION INTRAMUSCULAR; INTRAVENOUS ONCE
Status: DISCONTINUED | OUTPATIENT
Start: 2023-02-09 | End: 2023-02-09 | Stop reason: HOSPADM

## 2023-02-09 RX ORDER — MEPERIDINE HYDROCHLORIDE 25 MG/ML
12.5 INJECTION INTRAMUSCULAR; INTRAVENOUS; SUBCUTANEOUS ONCE
Status: DISCONTINUED | OUTPATIENT
Start: 2023-02-09 | End: 2023-02-09 | Stop reason: HOSPADM

## 2023-02-09 RX ORDER — DIPHENHYDRAMINE HYDROCHLORIDE 50 MG/ML
25 INJECTION INTRAMUSCULAR; INTRAVENOUS EVERY 6 HOURS PRN
Status: DISCONTINUED | OUTPATIENT
Start: 2023-02-09 | End: 2023-02-09 | Stop reason: HOSPADM

## 2023-02-09 RX ORDER — FENTANYL CITRATE 50 UG/ML
INJECTION, SOLUTION INTRAMUSCULAR; INTRAVENOUS
Status: DISCONTINUED | OUTPATIENT
Start: 2023-02-09 | End: 2023-02-09

## 2023-02-09 RX ORDER — METHOCARBAMOL 750 MG/1
750 TABLET, FILM COATED ORAL 3 TIMES DAILY
Qty: 30 TABLET | Refills: 0 | Status: SHIPPED | OUTPATIENT
Start: 2023-02-09 | End: 2023-03-10 | Stop reason: SDUPTHER

## 2023-02-09 RX ORDER — CEFAZOLIN SODIUM 2 G/50ML
2 SOLUTION INTRAVENOUS
Status: DISCONTINUED | OUTPATIENT
Start: 2023-02-09 | End: 2023-02-09 | Stop reason: HOSPADM

## 2023-02-09 RX ORDER — PROPOFOL 10 MG/ML
VIAL (ML) INTRAVENOUS
Status: DISCONTINUED | OUTPATIENT
Start: 2023-02-09 | End: 2023-02-09

## 2023-02-09 RX ORDER — MUPIROCIN 20 MG/G
OINTMENT TOPICAL
Status: DISCONTINUED | OUTPATIENT
Start: 2023-02-09 | End: 2023-02-09 | Stop reason: HOSPADM

## 2023-02-09 RX ORDER — ROPIVACAINE HYDROCHLORIDE 5 MG/ML
INJECTION, SOLUTION EPIDURAL; INFILTRATION; PERINEURAL
Status: DISCONTINUED
Start: 2023-02-09 | End: 2023-02-09 | Stop reason: HOSPADM

## 2023-02-09 RX ORDER — PHENYLEPHRINE HCL IN 0.9% NACL 1 MG/10 ML
SYRINGE (ML) INTRAVENOUS
Status: DISCONTINUED | OUTPATIENT
Start: 2023-02-09 | End: 2023-02-09

## 2023-02-09 RX ORDER — HYDROMORPHONE HYDROCHLORIDE 2 MG/ML
0.5 INJECTION, SOLUTION INTRAMUSCULAR; INTRAVENOUS; SUBCUTANEOUS EVERY 5 MIN PRN
Status: DISCONTINUED | OUTPATIENT
Start: 2023-02-09 | End: 2023-02-09 | Stop reason: HOSPADM

## 2023-02-09 RX ORDER — ONDANSETRON HYDROCHLORIDE 2 MG/ML
INJECTION, SOLUTION INTRAMUSCULAR; INTRAVENOUS
Status: DISCONTINUED | OUTPATIENT
Start: 2023-02-09 | End: 2023-02-09

## 2023-02-09 RX ORDER — CEFAZOLIN SODIUM 1 G/3ML
INJECTION, POWDER, FOR SOLUTION INTRAMUSCULAR; INTRAVENOUS
Status: DISCONTINUED | OUTPATIENT
Start: 2023-02-09 | End: 2023-02-09

## 2023-02-09 RX ORDER — ROPIVACAINE HYDROCHLORIDE 5 MG/ML
INJECTION, SOLUTION EPIDURAL; INFILTRATION; PERINEURAL
Status: COMPLETED | OUTPATIENT
Start: 2023-02-09 | End: 2023-02-09

## 2023-02-09 RX ADMIN — Medication 100 MCG: at 08:02

## 2023-02-09 RX ADMIN — LIDOCAINE HYDROCHLORIDE 30 MG: 20 INJECTION, SOLUTION EPIDURAL; INFILTRATION; INTRACAUDAL; PERINEURAL at 07:02

## 2023-02-09 RX ADMIN — ONDANSETRON HYDROCHLORIDE 4 MG: 2 INJECTION, SOLUTION INTRAMUSCULAR; INTRAVENOUS at 08:02

## 2023-02-09 RX ADMIN — MIDAZOLAM HYDROCHLORIDE 2 MG: 1 INJECTION, SOLUTION INTRAMUSCULAR; INTRAVENOUS at 06:02

## 2023-02-09 RX ADMIN — FENTANYL CITRATE 100 MCG: 50 INJECTION, SOLUTION INTRAMUSCULAR; INTRAVENOUS at 07:02

## 2023-02-09 RX ADMIN — SODIUM CHLORIDE, SODIUM GLUCONATE, SODIUM ACETATE, POTASSIUM CHLORIDE AND MAGNESIUM CHLORIDE: 526; 502; 368; 37; 30 INJECTION, SOLUTION INTRAVENOUS at 07:02

## 2023-02-09 RX ADMIN — PROPOFOL 200 MG: 10 INJECTION, EMULSION INTRAVENOUS at 07:02

## 2023-02-09 RX ADMIN — ROPIVACAINE HYDROCHLORIDE 45 ML: 5 INJECTION, SOLUTION EPIDURAL; INFILTRATION; PERINEURAL at 07:02

## 2023-02-09 RX ADMIN — MUPIROCIN: 20 OINTMENT TOPICAL at 06:02

## 2023-02-09 RX ADMIN — SODIUM CHLORIDE, SODIUM GLUCONATE, SODIUM ACETATE, POTASSIUM CHLORIDE AND MAGNESIUM CHLORIDE: 526; 502; 368; 37; 30 INJECTION, SOLUTION INTRAVENOUS at 08:02

## 2023-02-09 RX ADMIN — CEFAZOLIN 2 G: 330 INJECTION, POWDER, FOR SOLUTION INTRAMUSCULAR; INTRAVENOUS at 07:02

## 2023-02-09 NOTE — TRANSFER OF CARE
"Anesthesia Transfer of Care Note    Patient: Bruno Moreno    Procedure(s) Performed: Procedure(s) (LRB):  ORIF, FOREARM (Left)    Patient location: PACU    Anesthesia Type: general and regional    Transport from OR: Transported from OR on room air with adequate spontaneous ventilation    Post pain: adequate analgesia    Post assessment: no apparent anesthetic complications    Post vital signs: stable    Level of consciousness: awake, alert, oriented and responds to stimulation    Nausea/Vomiting: no nausea/vomiting    Complications: none    Transfer of care protocol was followed      Last vitals:   Visit Vitals  BP (!) 140/77   Pulse (!) 56   Temp 36.9 °C (98.5 °F) (Oral)   Resp 18   Ht 5' 10" (1.778 m)   Wt 99.9 kg (220 lb 3.8 oz)   SpO2 98%   BMI 31.60 kg/m²     "

## 2023-02-09 NOTE — DISCHARGE INSTRUCTIONS
ORTHO- PHIL    -NO driving and NO alcohol consumption for 24 hours and while taking narcotic pain medication.    -Follow up appointment: March 2, 2023 Thursday at 9:00 am    -Wound care: Leave dressing on-Keep it clean, dry, and intact until clinic visit     - Weight bearing status:No weight bearing to the Left arm, no range of motion     -Peripheral nerve block: Please wear your resting arm sling while you are up and about to prevent injury.  The block can last anywhere from 6-18 hours.  You may remove the sling once control and sensation of your arm returns.     -Apply ICE and ELEVATE extremity as needed to aid in pain and inflammation.    -Monitor for signs of INFECTION: redness, swelling, drainage/pus/foul odor, fever, chills. Also, monitor extremity for good circulation (pink, warm, etc)    -Report to your nearest ER/Notify your doctor if you experience any SUDDEN/SEVERE chest pain/abdominal pain, weakness, trouble breathing, uncontrolled pain.

## 2023-02-09 NOTE — ANESTHESIA PROCEDURE NOTES
Peripheral Block    Patient location during procedure: pre-op   Block not for primary anesthetic.  Reason for block: at surgeon's request and post-op pain management   Post-op Pain Location: Left wrist   Start time: 2/9/2023 7:03 AM  Timeout: 2/9/2023 6:58 AM   End time: 2/9/2023 7:08 AM    Staffing  Authorizing Provider: Elyse Gudino MD  Performing Provider: Chris Ley MD    Preanesthetic Checklist  Completed: patient identified, IV checked, site marked, risks and benefits discussed, surgical consent, monitors and equipment checked, pre-op evaluation and timeout performed  Peripheral Block  Patient position: supine  Prep: ChloraPrep  Patient monitoring: heart rate, cardiac monitor, continuous pulse ox and frequent blood pressure checks  Block type: axillary  Laterality: left  Injection technique: single shot  Needle  Needle type: Stimuplex   Needle gauge: 21 G  Needle length: 2 in  Needle localization: anatomical landmarks, ultrasound guidance and nerve stimulator   -ultrasound image captured on disc.  Assessment  Injection assessment: negative aspiration, negative parasthesia and local visualized surrounding nerve  Paresthesia pain: none  Heart rate change: no  Slow fractionated injection: yes  Pain Tolerance: comfortable throughout block and no complaints  Medications:    Medications: ropivacaine (NAROPIN) injection 0.5% - Perineural   45 mL - 2/9/2023 7:04:00 AM    Additional Notes  VSS.  [facility] RN monitoring vitals throughout procedure.  Patient tolerated procedure well.  U/S Image revealed normal appearing axillary anatomy. Continuously aspirated between incremental injections (HEME - neg). Appropriate neuro-stimulator twitch stopped @ 0.60 mA.

## 2023-02-09 NOTE — OP NOTE
OPERATIVE REPORT    Patient: Bruno Moreno   : 1955    MRN: [id number]  Date: 2023      Surgeon:Cleve Stout DO  Assistant: Dario Randhawa was essential, part of the procedure including deep hardware placement and deep closure.  No senior assistant was availible  Preoperative Diagnosis:Left distal radius fracture   Postoperative Diagnosis: Same  Procedure:   1) Open reduction and internal fixation of left extra-articular distal radius fracture  2) left brachioradialis  tendon tenotomy 35301  Anesthesiologist: Elyse Gudino MD  OR Staff: Circulator: Judy Mckee RN  Physician Assistant: Brionna Randhawa PA-C  Scrub Person: ST Chrissy  X-Ray Technologist: RT Anselmo  Implants: skeletal dynamics wide DR  EBL: 20cc  Complications: None  Disposition: To PACU, stable    Indications: Bruno Moreno is a 67 y.o. male presenting with the aforementioned injuries. The procedure is indicated to obtain and maintain stability of the wrist and allow early ROM.  The patient is awake and alert. After thorough discussion of the risks, benefits, expected outcomes, and alternatives to surgical intervention, the patient agreed to proceed with surgical treatment.  Specific risks discussed included, but were not limited to: superficial or deep infection, wound healing complications, DVT/PE, significant bleeding requiring transfusion, damage to named anatomic structures in the immediate area including named neurovascular structures, carpal tunnel syndrome, implant failure, and general risks of anesthesia.  The patient voiced understanding and written as well as verbal consent was obtained by myself prior to the procedure.    Procedure Note:  The patient was brought back to the OR and placed supine on the OR table. After successful induction of anesthesia by anesthesia staff, the patient was positioned in the supine position and all bony prominences were padded appropriately.  The  surgical field was then provisionally cleansed and then prepped and draped in the usual sterile fashion.    At this time a time-out was performed, with the correct patient, site, and procedure identified.  The universal time out as well as sign your site protocols were followed.  Preoperative antibiotics were verified as administered.     A standard volar FCR-based approach was utilized and attention to hemostasis was paid using electrocautery.  The FCR sheath was incised and the FCR retracted ulnarly.  The FCR subsheath was incised and the FPL swept ulnarly.  The pronator quadratus was seen and elevated radially and distally.  The fracture site was easily identified and the fracture was reduced.  The brachioradialis was tenotomized at the distal radius to allow improved reduction of the fracture.  We did pin the radius provisionally.      We placed the distal radius plate into position and used a 8 mm peg in the proximal hole.  The plate was fixed distally first, and the proximal aspect was then fixed to bone after removal of the proximal peg.  Final C-arm images were obtained and saved to the Where Was it Filmed system.  The extra-articular fracture was reduced and held nicely.  No additional implants were needed for stabilization of the distal radius.    The incisions were then irrigated using copious sterile saline and then closed in layered fashion.  The surgical sites were infiltrated using local anesthetic, and the arm was sterilely cleansed and dressed.    The patient was then subsequently extubated and transferred to to PACU in a stable condition.    All sponge and needle counts were correct at the end of the case.  I was present and participated in all aspects of the procedure.    Prognosis:  We will keep the patient NWB for now in a short arm splint.  The patient can do ROM of the fingers and elbow as tolerated.        This note/OR report was created with the assistance of  voice recognition software or phone   dictation.  There may be transcription errors as a result of using this technology however minimal. Effort has been made to assure accuracy of transcription but any obvious errors or omissions should be clarified with the author of the document.       Cleve Stout,   Orthopedic Trauma Surgery

## 2023-02-09 NOTE — INTERVAL H&P NOTE
The patient has been examined and the H&P has been reviewed:    I concur with the findings and no changes have occurred since H&P was written.    Anesthesia/Surgery risks, benefits and alternative options discussed and understood by patient/family.    I explained that surgery and the nature of their condition are not without risks. These include, but are not limited to, bleeding, infection, neurovascular compromise, malunion, nonunion, hardware complications, wound complications, scarring, cosmetic defects, need for later and/or repeated surgeries, pain, loss of ROM, loss of function, PTOA, deformity, stance/gait and/or functional abnormalities, thromboembolic complications, compartment syndrome, loss of limb, loss of life, anesthetic complications, and other imponderables. I explained that these can occur despite the adequacy of treatments rendered, and that their risks are heightened given the nature of their condition. They verbalized understanding. They would like to continue with surgery at this time. If appropriate family was involved with surgical discussion.       There are no hospital problems to display for this patient.

## 2023-02-09 NOTE — ANESTHESIA PREPROCEDURE EVALUATION
02/09/2023  Bruno Moreno is a 67 y.o., male.  S/p fall  ORIF forearm    Pre-op Assessment    I have reviewed the Patient Summary Reports.     I have reviewed the Nursing Notes. I have reviewed the NPO Status.   I have reviewed the Medications.     Review of Systems  Anesthesia Hx:  No problems with previous Anesthesia    Social:  Non-Smoker    Cardiovascular:   Hypertension        Physical Exam  General: Well nourished, Cooperative, Alert and Oriented    Airway:  Mallampati: II   Mouth Opening: Normal  TM Distance: Normal  Tongue: Normal  Neck ROM: Normal ROM    Dental:  Intact        Anesthesia Plan  Type of Anesthesia, risks & benefits discussed:    Anesthesia Type: Gen Supraglottic Airway  Intra-op Monitoring Plan: Standard ASA Monitors  Post Op Pain Control Plan: multimodal analgesia  Induction:  IV  Airway Plan: Direct, Post-Induction  Informed Consent: Informed consent signed with the Patient representative and all parties understand the risks and agree with anesthesia plan.  All questions answered. Patient consented to blood products? Yes  ASA Score: 2  Day of Surgery Review of History & Physical: H&P Update referred to the surgeon/provider.  Anesthesia Plan Notes: ETT if muscle relaxant needed    Ready For Surgery From Anesthesia Perspective.     .

## 2023-02-10 NOTE — DISCHARGE SUMMARY
Discharge Summary     Admit Date:2/9/23     Discharge Date:2/9/23     Operative Procedure:  Left forearm open reduction internal fixation     History of Present Illness/Hospital Course: Tolerated procedure     Discharge Disposition: Discharged to home     Activity:  Nonweightbearing range of motion as tolerated left wrist     Diet: resume previous home diet     Medications:  Percocet PO q4h PRN pain, robaxin 750 mg PO TID PRN spasms, Toradol 10 q6h PRN pain     Discharge Instructions: Daily dry dressing changes with replacement of splint beginning post op day 2     Follow Up: 2-3 weeks with Dr. Stout     This note/OR report was created with the assistance of  voice recognition software or phone  dictation.  There may be transcription errors as a result of using this technology however minimal. Effort has been made to assure accuracy of transcription but any obvious errors or omissions should be clarified with the author of the document.       Cleve Stout, DO  Orthopedic Trauma Surgery

## 2023-02-13 NOTE — ANESTHESIA POSTPROCEDURE EVALUATION
Anesthesia Post Evaluation    Patient: Bruno Moreno    Procedure(s) Performed: Procedure(s) (LRB):  ORIF, FOREARM (Left)    Final Anesthesia Type: general      Patient location during evaluation: PACU  Patient participation: Yes- Able to Participate  Level of consciousness: awake and alert  Post-procedure vital signs: reviewed and stable  Pain management: adequate  Airway patency: patent      Anesthetic complications: no      Cardiovascular status: hemodynamically stable  Respiratory status: unassisted  Hydration status: euvolemic  Follow-up not needed.          Vitals Value Taken Time   /83 02/09/23 1026   Temp 36.4 °C (97.6 °F) 02/09/23 1040   Pulse 61 02/09/23 1040   Resp 17 02/09/23 0934   SpO2 96 % 02/09/23 1040   Vitals shown include unvalidated device data.      Event Time   Out of Recovery 09:37:00         Pain/Gaudencio Score: No data recorded

## 2023-03-02 ENCOUNTER — OFFICE VISIT (OUTPATIENT)
Dept: ORTHOPEDICS | Facility: CLINIC | Age: 68
End: 2023-03-02
Payer: COMMERCIAL

## 2023-03-02 ENCOUNTER — HOSPITAL ENCOUNTER (OUTPATIENT)
Dept: RADIOLOGY | Facility: CLINIC | Age: 68
Discharge: HOME | End: 2023-03-02
Attending: ORTHOPAEDIC SURGERY
Payer: COMMERCIAL

## 2023-03-02 VITALS
DIASTOLIC BLOOD PRESSURE: 82 MMHG | WEIGHT: 220 LBS | TEMPERATURE: 98 F | HEIGHT: 70 IN | HEART RATE: 56 BPM | BODY MASS INDEX: 31.5 KG/M2 | SYSTOLIC BLOOD PRESSURE: 146 MMHG

## 2023-03-02 DIAGNOSIS — S52.502A CLOSED FRACTURE OF DISTAL END OF LEFT RADIUS, UNSPECIFIED FRACTURE MORPHOLOGY, INITIAL ENCOUNTER: ICD-10-CM

## 2023-03-02 DIAGNOSIS — S52.502A CLOSED FRACTURE OF DISTAL END OF LEFT RADIUS, UNSPECIFIED FRACTURE MORPHOLOGY, INITIAL ENCOUNTER: Primary | ICD-10-CM

## 2023-03-02 PROCEDURE — 4010F PR ACE/ARB THEARPY RXD/TAKEN: ICD-10-PCS | Mod: CPTII,,, | Performed by: PHYSICIAN ASSISTANT

## 2023-03-02 PROCEDURE — 73110 X-RAY EXAM OF WRIST: CPT | Mod: LT,,, | Performed by: ORTHOPAEDIC SURGERY

## 2023-03-02 PROCEDURE — 3008F PR BODY MASS INDEX (BMI) DOCUMENTED: ICD-10-PCS | Mod: CPTII,,, | Performed by: PHYSICIAN ASSISTANT

## 2023-03-02 PROCEDURE — 1101F PT FALLS ASSESS-DOCD LE1/YR: CPT | Mod: CPTII,,, | Performed by: PHYSICIAN ASSISTANT

## 2023-03-02 PROCEDURE — 3079F DIAST BP 80-89 MM HG: CPT | Mod: CPTII,,, | Performed by: PHYSICIAN ASSISTANT

## 2023-03-02 PROCEDURE — 1159F MED LIST DOCD IN RCRD: CPT | Mod: CPTII,,, | Performed by: PHYSICIAN ASSISTANT

## 2023-03-02 PROCEDURE — 4010F ACE/ARB THERAPY RXD/TAKEN: CPT | Mod: CPTII,,, | Performed by: PHYSICIAN ASSISTANT

## 2023-03-02 PROCEDURE — 3288F PR FALLS RISK ASSESSMENT DOCUMENTED: ICD-10-PCS | Mod: CPTII,,, | Performed by: PHYSICIAN ASSISTANT

## 2023-03-02 PROCEDURE — 3288F FALL RISK ASSESSMENT DOCD: CPT | Mod: CPTII,,, | Performed by: PHYSICIAN ASSISTANT

## 2023-03-02 PROCEDURE — 3077F PR MOST RECENT SYSTOLIC BLOOD PRESSURE >= 140 MM HG: ICD-10-PCS | Mod: CPTII,,, | Performed by: PHYSICIAN ASSISTANT

## 2023-03-02 PROCEDURE — 3079F PR MOST RECENT DIASTOLIC BLOOD PRESSURE 80-89 MM HG: ICD-10-PCS | Mod: CPTII,,, | Performed by: PHYSICIAN ASSISTANT

## 2023-03-02 PROCEDURE — 1101F PR PT FALLS ASSESS DOC 0-1 FALLS W/OUT INJ PAST YR: ICD-10-PCS | Mod: CPTII,,, | Performed by: PHYSICIAN ASSISTANT

## 2023-03-02 PROCEDURE — 1159F PR MEDICATION LIST DOCUMENTED IN MEDICAL RECORD: ICD-10-PCS | Mod: CPTII,,, | Performed by: PHYSICIAN ASSISTANT

## 2023-03-02 PROCEDURE — 99024 POSTOP FOLLOW-UP VISIT: CPT | Mod: ,,, | Performed by: PHYSICIAN ASSISTANT

## 2023-03-02 PROCEDURE — 73110 XR WRIST COMPLETE 3 VIEWS LEFT: ICD-10-PCS | Mod: LT,,, | Performed by: ORTHOPAEDIC SURGERY

## 2023-03-02 PROCEDURE — 3077F SYST BP >= 140 MM HG: CPT | Mod: CPTII,,, | Performed by: PHYSICIAN ASSISTANT

## 2023-03-02 PROCEDURE — 99024 PR POST-OP FOLLOW-UP VISIT: ICD-10-PCS | Mod: ,,, | Performed by: PHYSICIAN ASSISTANT

## 2023-03-02 PROCEDURE — 3008F BODY MASS INDEX DOCD: CPT | Mod: CPTII,,, | Performed by: PHYSICIAN ASSISTANT

## 2023-03-02 NOTE — PROGRESS NOTES
"  Subjective:       Patient ID: Bruno Moreno is a 67 y.o. male.  Chief Complaint   Patient presents with    Left Wrist - Post-op Evaluation     3 week f/u from orif left distal radius fx. Reports in some improvement in pain.         HPI    Patient presents for 3 week follow up ORIF left distal radius fx. Sutures remain in place. Doing very well overall. Taking minimal pain medication and muscle relaxer at this time. Denies numbness and tingling distally. No acute complaints.     ROS:  Constitutional: Denies fever chills  Eyes: No change in vision  ENT: No ringing or current infections  CV: No chest pain  Resp: No labored breathing  MSK: Pain evident at site of injury located in HPI,   Integ: No signs of abrasions or lacerations  Neuro: No numbness or tingling  Lymphatic: No swelling outside the area of injury     Current Outpatient Medications on File Prior to Visit   Medication Sig Dispense Refill    aspirin (ECOTRIN) 81 MG EC tablet Take 81 mg by mouth once daily.      benazepriL (LOTENSIN) 40 MG tablet Take 40 mg by mouth once daily.      citalopram (CELEXA) 20 MG tablet Take 20 mg by mouth once daily.      co-enzyme Q-10 30 mg capsule Take 60 mg by mouth once daily.      ezetimibe (ZETIA) 10 mg tablet Take 10 mg by mouth once daily.      lovastatin (MEVACOR) 20 MG tablet Take 20 mg by mouth once daily.      NON FORMULARY MEDICATION Apply 4 mg topically every evening. Testosterone       No current facility-administered medications on file prior to visit.          Objective:      BP (!) 146/82   Pulse (!) 56   Temp 98 °F (36.7 °C)   Ht 5' 10" (1.778 m)   Wt 99.8 kg (220 lb)   BMI 31.57 kg/m²   Physical Exam  General the patient is alert and oriented x3 no acute distress nontoxic-appearing appropriate affect.    Constitutional: Vital signs are examined and stable.  Resp: No signs of labored breathing    Musculoskeletal:     Left upper extremity: incision clean and dry without erythema, drainage, signs of " dehiscence. Sutures remain in place today; compartments are soft and compressible; tolerates passive ROM at the wrist and digits, appropriate tenderness to palpation; AIN/PIN/Ulnar motor intact; SILT distally;BCR distally; Radial pulse 2+        Body mass index is 31.57 kg/m².  Ideal body weight: 73 kg (160 lb 15 oz)  Adjusted ideal body weight: 83.7 kg (184 lb 9 oz)  No results found for: HGBA1C  Hgb   Date Value Ref Range Status   02/06/2023 14.0 14.0 - 18.0 gm/dL Final     Hct   Date Value Ref Range Status   02/06/2023 42.5 42.0 - 52.0 % Final     No results found for: IRON  No components found for: FROLATE  No results found for: [id number]  WBC   Date Value Ref Range Status   02/06/2023 7.6 4.5 - 11.5 x10(3)/mcL Final       Radiology: 3 view x ray left wrist: hardware intact without signs of loosening or failure. No bony consolidation noted as expected. No volar or dorsal angulation.        Assessment:         1. Closed fracture of distal end of left radius, unspecified fracture morphology, initial encounter  X-Ray Wrist Complete Left              Plan:         Follow up in about 6 weeks (around 4/13/2023), or if symptoms worsen or fail to improve.    Bruno was seen today for post-op evaluation.    Diagnoses and all orders for this visit:    Closed fracture of distal end of left radius, unspecified fracture morphology, initial encounter  -     X-Ray Wrist Complete Left; Future        - remain NWB NASREEN. Velcro wrist brace for support. May come out of brace multiple times daily for range of motion exercises.   May shower and wet incisions, pat dry. Do not apply ointments or creams   -Follow up in 6 weeks for repeat x rays or evaluation.  -ED precautions given    The above findings, diagnostics, and treatment plan were discussed with Dr. Stout who is in agreement with the plan of care except as stated in additional documentation.       Brionna Randhawa PA-C          Future Appointments   Date Time Provider  Department Center   4/13/2023  9:00 AM Cleve Stout, DO [facility]

## 2023-03-13 RX ORDER — HYDROCODONE BITARTRATE AND ACETAMINOPHEN 10; 325 MG/1; MG/1
1 TABLET ORAL EVERY 6 HOURS PRN
Qty: 28 TABLET | Refills: 0 | Status: SHIPPED | OUTPATIENT
Start: 2023-03-13 | End: 2023-03-20

## 2023-03-13 RX ORDER — METHOCARBAMOL 750 MG/1
750 TABLET, FILM COATED ORAL 3 TIMES DAILY
Qty: 30 TABLET | Refills: 0 | Status: SHIPPED | OUTPATIENT
Start: 2023-03-13 | End: 2023-03-23

## 2023-04-13 ENCOUNTER — OFFICE VISIT (OUTPATIENT)
Dept: ORTHOPEDICS | Facility: CLINIC | Age: 68
End: 2023-04-13
Payer: COMMERCIAL

## 2023-04-13 ENCOUNTER — HOSPITAL ENCOUNTER (OUTPATIENT)
Dept: RADIOLOGY | Facility: CLINIC | Age: 68
Discharge: HOME | End: 2023-04-13
Attending: ORTHOPAEDIC SURGERY
Payer: COMMERCIAL

## 2023-04-13 VITALS
DIASTOLIC BLOOD PRESSURE: 83 MMHG | WEIGHT: 220 LBS | HEIGHT: 70 IN | HEART RATE: 73 BPM | TEMPERATURE: 98 F | SYSTOLIC BLOOD PRESSURE: 139 MMHG | BODY MASS INDEX: 31.5 KG/M2

## 2023-04-13 DIAGNOSIS — S52.502D CLOSED FRACTURE OF DISTAL END OF LEFT RADIUS WITH ROUTINE HEALING, UNSPECIFIED FRACTURE MORPHOLOGY, SUBSEQUENT ENCOUNTER: Primary | ICD-10-CM

## 2023-04-13 DIAGNOSIS — S52.502D CLOSED FRACTURE OF DISTAL END OF LEFT RADIUS WITH ROUTINE HEALING, UNSPECIFIED FRACTURE MORPHOLOGY, SUBSEQUENT ENCOUNTER: ICD-10-CM

## 2023-04-13 PROCEDURE — 3288F FALL RISK ASSESSMENT DOCD: CPT | Mod: CPTII,,, | Performed by: PHYSICIAN ASSISTANT

## 2023-04-13 PROCEDURE — 1100F PR PT FALLS ASSESS DOC 2+ FALLS/FALL W/INJURY/YR: ICD-10-PCS | Mod: CPTII,,, | Performed by: PHYSICIAN ASSISTANT

## 2023-04-13 PROCEDURE — 99024 POSTOP FOLLOW-UP VISIT: CPT | Mod: ,,, | Performed by: PHYSICIAN ASSISTANT

## 2023-04-13 PROCEDURE — 1100F PTFALLS ASSESS-DOCD GE2>/YR: CPT | Mod: CPTII,,, | Performed by: PHYSICIAN ASSISTANT

## 2023-04-13 PROCEDURE — 4010F ACE/ARB THERAPY RXD/TAKEN: CPT | Mod: CPTII,,, | Performed by: PHYSICIAN ASSISTANT

## 2023-04-13 PROCEDURE — 3288F PR FALLS RISK ASSESSMENT DOCUMENTED: ICD-10-PCS | Mod: CPTII,,, | Performed by: PHYSICIAN ASSISTANT

## 2023-04-13 PROCEDURE — 3075F PR MOST RECENT SYSTOLIC BLOOD PRESS GE 130-139MM HG: ICD-10-PCS | Mod: CPTII,,, | Performed by: PHYSICIAN ASSISTANT

## 2023-04-13 PROCEDURE — 73110 X-RAY EXAM OF WRIST: CPT | Mod: LT,,, | Performed by: ORTHOPAEDIC SURGERY

## 2023-04-13 PROCEDURE — 3008F PR BODY MASS INDEX (BMI) DOCUMENTED: ICD-10-PCS | Mod: CPTII,,, | Performed by: PHYSICIAN ASSISTANT

## 2023-04-13 PROCEDURE — 1126F AMNT PAIN NOTED NONE PRSNT: CPT | Mod: CPTII,,, | Performed by: PHYSICIAN ASSISTANT

## 2023-04-13 PROCEDURE — 3075F SYST BP GE 130 - 139MM HG: CPT | Mod: CPTII,,, | Performed by: PHYSICIAN ASSISTANT

## 2023-04-13 PROCEDURE — 3079F DIAST BP 80-89 MM HG: CPT | Mod: CPTII,,, | Performed by: PHYSICIAN ASSISTANT

## 2023-04-13 PROCEDURE — 1126F PR PAIN SEVERITY QUANTIFIED, NO PAIN PRESENT: ICD-10-PCS | Mod: CPTII,,, | Performed by: PHYSICIAN ASSISTANT

## 2023-04-13 PROCEDURE — 3008F BODY MASS INDEX DOCD: CPT | Mod: CPTII,,, | Performed by: PHYSICIAN ASSISTANT

## 2023-04-13 PROCEDURE — 73110 XR WRIST COMPLETE 3 VIEWS LEFT: ICD-10-PCS | Mod: LT,,, | Performed by: ORTHOPAEDIC SURGERY

## 2023-04-13 PROCEDURE — 99024 PR POST-OP FOLLOW-UP VISIT: ICD-10-PCS | Mod: ,,, | Performed by: PHYSICIAN ASSISTANT

## 2023-04-13 PROCEDURE — 4010F PR ACE/ARB THEARPY RXD/TAKEN: ICD-10-PCS | Mod: CPTII,,, | Performed by: PHYSICIAN ASSISTANT

## 2023-04-13 PROCEDURE — 3079F PR MOST RECENT DIASTOLIC BLOOD PRESSURE 80-89 MM HG: ICD-10-PCS | Mod: CPTII,,, | Performed by: PHYSICIAN ASSISTANT

## 2023-04-13 RX ORDER — AMLODIPINE AND BENAZEPRIL HYDROCHLORIDE 5; 20 MG/1; MG/1
1 CAPSULE ORAL
COMMUNITY
Start: 2023-04-06

## 2023-04-13 NOTE — PROGRESS NOTES
"  Subjective:       Patient ID: Bruno Moreno is a 67 y.o. male.  Chief Complaint   Patient presents with    Left Wrist - Follow-up     9 week f/u from ORIF left distal radius fx. Present in exos brace. No c/o of pain or discomfort.         HPI    Patient presents for 9 week follow up  ORIF left distal radius fracture he has a brace tot he left wrist in place today. No pain or discomfort. Has been at 5 lb weight bearing restrictions. No numbness and tingling distally, has been stretching fingers, only coming out of brace for showers and ROM during that time, otherwise has remained in the brace at all times.    ROS:  Constitutional: Denies fever chills  Eyes: No change in vision  ENT: No ringing or current infections  CV: No chest pain  Resp: No labored breathing  MSK: Pain evident at site of injury located in HPI,   Integ: No signs of abrasions or lacerations  Neuro: No numbness or tingling  Lymphatic: No swelling outside the area of injury     Current Outpatient Medications on File Prior to Visit   Medication Sig Dispense Refill    amlodipine-benazepril 5-20 mg (LOTREL) 5-20 mg per capsule Take 1 capsule by mouth.      aspirin (ECOTRIN) 81 MG EC tablet Take 81 mg by mouth once daily.      benazepriL (LOTENSIN) 40 MG tablet Take 40 mg by mouth once daily.      citalopram (CELEXA) 20 MG tablet Take 20 mg by mouth once daily.      co-enzyme Q-10 30 mg capsule Take 60 mg by mouth once daily.      ezetimibe (ZETIA) 10 mg tablet Take 10 mg by mouth once daily.      lovastatin (MEVACOR) 20 MG tablet Take 20 mg by mouth once daily.      NON FORMULARY MEDICATION Apply 4 mg topically every evening. Testosterone       No current facility-administered medications on file prior to visit.          Objective:      /83   Pulse 73   Temp 98 °F (36.7 °C)   Ht 5' 10" (1.778 m)   Wt 99.8 kg (220 lb)   BMI 31.57 kg/m²   Physical Exam  General the patient is alert and oriented x3 no acute distress nontoxic-appearing " appropriate affect.    Constitutional: Vital signs are examined and stable.  Resp: No signs of labored breathing    Musculoskeletal:     Left upper extremity: no swelling; no deformity; no open wounds; incisions are well healed and matured; compartments are soft and compressible; no pain with ROM at the wrist or digits although some stiffness with dorsiflexion, appropriate tenderness to palpation; AIN/PIN/Ulnar motor intact; SILT distally;BCR distally; Radial pulse 2+        Body mass index is 31.57 kg/m².  Ideal body weight: 73 kg (160 lb 15 oz)  Adjusted ideal body weight: 83.7 kg (184 lb 9 oz)  No results found for: HGBA1C  Hgb   Date Value Ref Range Status   02/06/2023 14.0 14.0 - 18.0 gm/dL Final     Hct   Date Value Ref Range Status   02/06/2023 42.5 42.0 - 52.0 % Final     No results found for: IRON  No components found for: FROLATE  No results found for: [id number]  WBC   Date Value Ref Range Status   02/06/2023 7.6 4.5 - 11.5 x10(3)/mcL Final       Radiology: 3 view x ray left wrist: hardware intact without signs of loosening or failure. Bony changes noted today although not much callus formation present.         Assessment:         1. Closed fracture of distal end of left radius with routine healing, unspecified fracture morphology, subsequent encounter  X-Ray Wrist Complete Left              Plan:         Follow up in about 6 weeks (around 5/25/2023), or if symptoms worsen or fail to improve.    Bruno was seen today for follow-up.    Diagnoses and all orders for this visit:    Closed fracture of distal end of left radius with routine healing, unspecified fracture morphology, subsequent encounter  -     X-Ray Wrist Complete Left; Future        -Remain with 5 lb weight bearing restrictions x 3 weeks. Okay to drive [employer]  with brace. Following 3 weeks, may progress to weight bearing as tolerated. Begin to come out of brace for range of motin exercises of the left wrist.   -Follow up in 6 weeks  for repeat x rays and evaluation  -Denies smoking or nicotine use.  -ED precautions given    The above findings, diagnostics, and treatment plan were discussed with Dr. Stout who is in agreement with the plan of care except as stated in additional documentation.       Brionna Randhawa PA-C          Future Appointments   Date Time Provider Department Center   5/31/2023  9:15 AM Cleve Stout DO [facility]

## 2023-05-31 ENCOUNTER — OFFICE VISIT (OUTPATIENT)
Dept: ORTHOPEDICS | Facility: CLINIC | Age: 68
End: 2023-05-31
Payer: COMMERCIAL

## 2023-05-31 ENCOUNTER — HOSPITAL ENCOUNTER (OUTPATIENT)
Dept: RADIOLOGY | Facility: CLINIC | Age: 68
Discharge: HOME | End: 2023-05-31
Attending: ORTHOPAEDIC SURGERY
Payer: COMMERCIAL

## 2023-05-31 VITALS
HEART RATE: 84 BPM | TEMPERATURE: 97 F | SYSTOLIC BLOOD PRESSURE: 130 MMHG | BODY MASS INDEX: 31.5 KG/M2 | WEIGHT: 220 LBS | DIASTOLIC BLOOD PRESSURE: 79 MMHG | HEIGHT: 70 IN

## 2023-05-31 DIAGNOSIS — S52.502D CLOSED FRACTURE OF DISTAL END OF LEFT RADIUS WITH ROUTINE HEALING, UNSPECIFIED FRACTURE MORPHOLOGY, SUBSEQUENT ENCOUNTER: Primary | ICD-10-CM

## 2023-05-31 DIAGNOSIS — S52.502D CLOSED FRACTURE OF DISTAL END OF LEFT RADIUS WITH ROUTINE HEALING, UNSPECIFIED FRACTURE MORPHOLOGY, SUBSEQUENT ENCOUNTER: ICD-10-CM

## 2023-05-31 PROCEDURE — 3078F DIAST BP <80 MM HG: CPT | Mod: CPTII,,, | Performed by: PHYSICIAN ASSISTANT

## 2023-05-31 PROCEDURE — 3288F FALL RISK ASSESSMENT DOCD: CPT | Mod: CPTII,,, | Performed by: PHYSICIAN ASSISTANT

## 2023-05-31 PROCEDURE — 3078F PR MOST RECENT DIASTOLIC BLOOD PRESSURE < 80 MM HG: ICD-10-PCS | Mod: CPTII,,, | Performed by: PHYSICIAN ASSISTANT

## 2023-05-31 PROCEDURE — 1159F PR MEDICATION LIST DOCUMENTED IN MEDICAL RECORD: ICD-10-PCS | Mod: CPTII,,, | Performed by: PHYSICIAN ASSISTANT

## 2023-05-31 PROCEDURE — 1159F MED LIST DOCD IN RCRD: CPT | Mod: CPTII,,, | Performed by: PHYSICIAN ASSISTANT

## 2023-05-31 PROCEDURE — 99213 PR OFFICE/OUTPT VISIT, EST, LEVL III, 20-29 MIN: ICD-10-PCS | Mod: ,,, | Performed by: PHYSICIAN ASSISTANT

## 2023-05-31 PROCEDURE — 73110 XR WRIST COMPLETE 3 VIEWS LEFT: ICD-10-PCS | Mod: LT,,, | Performed by: ORTHOPAEDIC SURGERY

## 2023-05-31 PROCEDURE — 1100F PTFALLS ASSESS-DOCD GE2>/YR: CPT | Mod: CPTII,,, | Performed by: PHYSICIAN ASSISTANT

## 2023-05-31 PROCEDURE — 4010F PR ACE/ARB THEARPY RXD/TAKEN: ICD-10-PCS | Mod: CPTII,,, | Performed by: PHYSICIAN ASSISTANT

## 2023-05-31 PROCEDURE — 4010F ACE/ARB THERAPY RXD/TAKEN: CPT | Mod: CPTII,,, | Performed by: PHYSICIAN ASSISTANT

## 2023-05-31 PROCEDURE — 3075F SYST BP GE 130 - 139MM HG: CPT | Mod: CPTII,,, | Performed by: PHYSICIAN ASSISTANT

## 2023-05-31 PROCEDURE — 73110 X-RAY EXAM OF WRIST: CPT | Mod: LT,,, | Performed by: ORTHOPAEDIC SURGERY

## 2023-05-31 PROCEDURE — 3075F PR MOST RECENT SYSTOLIC BLOOD PRESS GE 130-139MM HG: ICD-10-PCS | Mod: CPTII,,, | Performed by: PHYSICIAN ASSISTANT

## 2023-05-31 PROCEDURE — 3008F BODY MASS INDEX DOCD: CPT | Mod: CPTII,,, | Performed by: PHYSICIAN ASSISTANT

## 2023-05-31 PROCEDURE — 3008F PR BODY MASS INDEX (BMI) DOCUMENTED: ICD-10-PCS | Mod: CPTII,,, | Performed by: PHYSICIAN ASSISTANT

## 2023-05-31 PROCEDURE — 99213 OFFICE O/P EST LOW 20 MIN: CPT | Mod: ,,, | Performed by: PHYSICIAN ASSISTANT

## 2023-05-31 PROCEDURE — 1100F PR PT FALLS ASSESS DOC 2+ FALLS/FALL W/INJURY/YR: ICD-10-PCS | Mod: CPTII,,, | Performed by: PHYSICIAN ASSISTANT

## 2023-05-31 PROCEDURE — 3288F PR FALLS RISK ASSESSMENT DOCUMENTED: ICD-10-PCS | Mod: CPTII,,, | Performed by: PHYSICIAN ASSISTANT

## 2023-05-31 NOTE — PROGRESS NOTES
"  Subjective:       Patient ID: Bruno Moreno is a 67 y.o. male.  Chief Complaint   Patient presents with    Left Wrist - Follow-up     3.5 MONTH F/U FROM ORIF LEFT DISTAL RADIUS FX. REPORTS NUMBNESS IN HIS LEFT HAND. NO C/O OF PAIN OR DISCOMFORT.         HPI    Patient presents for 3.5 month follow up ORIF left distal radius. He is 5lb weight bearing with therapy. He is doing some light activity such as cutting his grass with a riding mower. Has begun working on range of motion exercises of the left wrist. We will allow him to progress his weight bearing a this time. Able to form full fist although states some tightness there. Denies numbness and tinging in the digits.    ROS:  Constitutional: Denies fever chills  Eyes: No change in vision  ENT: No ringing or current infections  CV: No chest pain  Resp: No labored breathing  MSK: Pain evident at site of injury located in HPI,   Integ: No signs of abrasions or lacerations  Neuro: No numbness or tingling  Lymphatic: No swelling outside the area of injury     Current Outpatient Medications on File Prior to Visit   Medication Sig Dispense Refill    amlodipine-benazepril 5-20 mg (LOTREL) 5-20 mg per capsule Take 1 capsule by mouth.      aspirin (ECOTRIN) 81 MG EC tablet Take 81 mg by mouth once daily.      benazepriL (LOTENSIN) 40 MG tablet Take 40 mg by mouth once daily.      citalopram (CELEXA) 20 MG tablet Take 40 mg by mouth once daily.      co-enzyme Q-10 30 mg capsule Take 60 mg by mouth once daily.      ezetimibe (ZETIA) 10 mg tablet Take 10 mg by mouth once daily.      lovastatin (MEVACOR) 20 MG tablet Take 20 mg by mouth once daily.      NON FORMULARY MEDICATION Apply 4 mg topically every evening. Testosterone       No current facility-administered medications on file prior to visit.          Objective:      /79   Pulse 84   Temp 97 °F (36.1 °C)   Ht 5' 10" (1.778 m)   Wt 99.8 kg (220 lb)   BMI 31.57 kg/m²   Physical Exam  General the patient is " alert and oriented x3 no acute distress nontoxic-appearing appropriate affect.    Constitutional: Vital signs are examined and stable.  Resp: No signs of labored breathing    Musculoskeletal:   Left upper extremity: no swelling; no deformity; no open wounds, incisions are well healed; compartments are soft and compressible; no pain with ROM at the shoulder, elbow, wrist, or digits, no tenderness to palpation; AIN/PIN/Ulnar motor intact; SILT distally;BCR distally; Radial pulse 2+        Body mass index is 31.57 kg/m².  Ideal body weight: 73 kg (160 lb 15 oz)  Adjusted ideal body weight: 83.7 kg (184 lb 9 oz)  No results found for: HGBA1C  Hgb   Date Value Ref Range Status   02/06/2023 14.0 14.0 - 18.0 gm/dL Final     Hct   Date Value Ref Range Status   02/06/2023 42.5 42.0 - 52.0 % Final     No results found for: IRON  No components found for: FROLATE  No results found for: [id number]  WBC   Date Value Ref Range Status   02/06/2023 7.6 4.5 - 11.5 x10(3)/mcL Final       Radiology: 3 view x ray left wrist: hardware intact without signs of loosenign or failure. Continued consolidation consistent with routine healing. No volar or dorsal angulation of fracture fragments.        Assessment:         1. Closed fracture of distal end of left radius with routine healing, unspecified fracture morphology, subsequent encounter  X-Ray Wrist Complete Left    Ambulatory referral/consult to Physical/Occupational Therapy              Plan:         Follow up if symptoms worsen or fail to improve.    Bruno was seen today for follow-up.    Diagnoses and all orders for this visit:    Closed fracture of distal end of left radius with routine healing, unspecified fracture morphology, subsequent encounter  -     X-Ray Wrist Complete Left; Future  -     Ambulatory referral/consult to Physical/Occupational Therapy; Future        -Doing very well overall. Fracture appears to be healing well on imaging today. Therapy order provided to  progress weight bearing and range of motion.   -Return to work as tolerated without restriction. WBAT and ROMAT  - He will follow up in the future if he has any concerns. I did offer 2 month follow up for final evaluation for him today which he politely declined as he has returned to normal activity and would like to prevent missing work and other home activities for an appointment.  -ED precautions given    The above findings, diagnostics, and treatment plan were discussed with Dr. Stout who is in agreement with the plan of care except as stated in additional documentation.       GILSON Devlin-CROW          No future appointments.

## (undated) DEVICE — GLOVE PROTEXIS NEU-THERA SZ6

## (undated) DEVICE — STAPLER SKIN PROXIMATE WIDE

## (undated) DEVICE — ELECTRODE REM POLYHESIVE II

## (undated) DEVICE — GOWN SMARTGOWN 3XL XLONG

## (undated) DEVICE — BANDAGE ESMARK 4INX3YD

## (undated) DEVICE — DRESSING XEROFORM 5X9IN

## (undated) DEVICE — GAUZE SPONGE 4X4 12PLY

## (undated) DEVICE — GLOVE PROTEXIS BLUE LATEX 9

## (undated) DEVICE — SUT ETHILON 2-0 FSLX 30 BLK

## (undated) DEVICE — Device

## (undated) DEVICE — COVER FULLGUARD SHOE HIGH-TOP

## (undated) DEVICE — DRAPE ORTH SPLIT 77X108IN

## (undated) DEVICE — APPLICATOR CHLORAPREP ORN 26ML

## (undated) DEVICE — PADDING 4X4YD SPECIALIST100

## (undated) DEVICE — SPLINTING ORTHO GLASS1 X15FT

## (undated) DEVICE — SUT MCRYL PLUS 2-0 CT-1 36IN

## (undated) DEVICE — GLOVE PROTEXIS HYDROGEL SZ9

## (undated) DEVICE — COVER TABLE HVY DTY 60X90IN

## (undated) DEVICE — GLOVE PROTEXIS HYDROGEL SZ6

## (undated) DEVICE — TAPE SILK 3IN